# Patient Record
(demographics unavailable — no encounter records)

---

## 2025-01-02 NOTE — REASON FOR VISIT
[Follow-Up Visit] : a follow-up visit for [Ankle Sprain] : ankle sprain [Confirmed Foot Fracture] : confirmed foot fracture [Foot Pain] : foot pain

## 2025-01-06 NOTE — PHYSICAL EXAM
[2+] : left foot dorsalis pedis 2+ [Sensation] : the sensory exam was normal to light touch and pinprick [No Focal Deficits] : no focal deficits [Deep Tendon Reflexes (DTR)] : deep tendon reflexes were 2+ and symmetric [Motor Exam] : the motor exam was normal [Ankle Swelling (On Exam)] : not present [Varicose Veins Of Lower Extremities] : not present [Delayed in the Right Toes] : capillary refills normal in right toes [Delayed in the Left Toes] : capillary refills normal in the left toes [FreeTextEntry3] : Hair growth noted on digits. Proximal to distal cooling is within normal limits.  [de-identified] : Healed cuboid fracture.  [FreeTextEntry1] : Onychomycotic nails 1 and 4 bilateral. They are yellow, thick, brittle with subungual debris, dystrophic, deformed and painful at the tops and tips.  [Diminished Throughout Right Foot] : normal sensation with monofilament testing throughout right foot [Diminished Throughout Left Foot] : normal sensation with monofilament testing throughout left foot

## 2025-01-06 NOTE — PHYSICAL EXAM
[2+] : left foot dorsalis pedis 2+ [Sensation] : the sensory exam was normal to light touch and pinprick [No Focal Deficits] : no focal deficits [Deep Tendon Reflexes (DTR)] : deep tendon reflexes were 2+ and symmetric [Motor Exam] : the motor exam was normal [Ankle Swelling (On Exam)] : not present [Varicose Veins Of Lower Extremities] : not present [Delayed in the Right Toes] : capillary refills normal in right toes [Delayed in the Left Toes] : capillary refills normal in the left toes [FreeTextEntry3] : Hair growth noted on digits. Proximal to distal cooling is within normal limits.  [de-identified] : Healed cuboid fracture.  [FreeTextEntry1] : Onychomycotic nails 1 and 4 bilateral. They are yellow, thick, brittle with subungual debris, dystrophic, deformed and painful at the tops and tips.  [Diminished Throughout Right Foot] : normal sensation with monofilament testing throughout right foot [Diminished Throughout Left Foot] : normal sensation with monofilament testing throughout left foot

## 2025-01-06 NOTE — PHYSICAL EXAM
[2+] : left foot dorsalis pedis 2+ [Sensation] : the sensory exam was normal to light touch and pinprick [No Focal Deficits] : no focal deficits [Deep Tendon Reflexes (DTR)] : deep tendon reflexes were 2+ and symmetric [Motor Exam] : the motor exam was normal [Ankle Swelling (On Exam)] : not present [Varicose Veins Of Lower Extremities] : not present [Delayed in the Right Toes] : capillary refills normal in right toes [Delayed in the Left Toes] : capillary refills normal in the left toes [FreeTextEntry3] : Hair growth noted on digits. Proximal to distal cooling is within normal limits.  [de-identified] : Healed cuboid fracture.  [FreeTextEntry1] : Onychomycotic nails 1 and 4 bilateral. They are yellow, thick, brittle with subungual debris, dystrophic, deformed and painful at the tops and tips.  [Diminished Throughout Right Foot] : normal sensation with monofilament testing throughout right foot [Diminished Throughout Left Foot] : normal sensation with monofilament testing throughout left foot

## 2025-01-06 NOTE — ASSESSMENT
[FreeTextEntry1] : Impression: Onychomycosis. Pain.  Treatment: I manually and mechanically debrided mycotic nails x4 using a small straight nail splitter and rotary faustino. The nails were aggressively debrided and debulked to make them comfortable in shoe gear. I ePrescribed a new prescription for antifungal. Follow-up in 3 months.

## 2025-01-06 NOTE — HISTORY OF PRESENT ILLNESS
[FreeTextEntry1] : Patient presents today because she has old cuboid fracture. It is doing well. That is healed. She has onychomycosis 1 and 4 that are yellow, thick, brittle with subungual debris and mycosis. They are dystrophic, deformed and painful.

## 2025-04-18 NOTE — REASON FOR VISIT
[Initial Evaluation] : an initial evaluation for [Hearing Loss] : hearing loss [FreeTextEntry2] : ear

## 2025-04-18 NOTE — PROCEDURE
[FreeTextEntry3] : Procedure- Removal of cerumen left Diagnosis - Left cerumen impaction verbal consent obtained Left ear found to have some impacted cerumen - it was cleared with suction and curette, canal appeared normal.

## 2025-04-18 NOTE — CONSULT LETTER
[Dear  ___] : Dear  [unfilled], [Consult Letter:] : I had the pleasure of evaluating your patient, [unfilled]. [Please see my note below.] : Please see my note below. [Consult Closing:] : Thank you very much for allowing me to participate in the care of this patient.  If you have any questions, please do not hesitate to contact me. [FreeTextEntry3] : Sincerely,   Trinh Sung MD Otolaryngology- Facial Plastics  [address] [place] [id number] (P) - [phone] (F) - [phone]

## 2025-04-18 NOTE — END OF VISIT
[FreeTextEntry3] : I personally saw and examined the patient in detail. I spoke to BARRY Quezada regarding the assessment and plan of care.  I preformed the procedures and I reviewed the above assessment and plan of care, and agree. I have made changes in changes in the body of the note where appropriate.

## 2025-04-18 NOTE — ASSESSMENT
[FreeTextEntry1] : 75 year old female presents for evaluation of heaviness and buzzing at left ear for last 3 weeks. On exam, excessive cerumen on left, cleared today using suction. possible fluid L TM, but audio shows no effusions - audiogram performed and reviewed 04/18/25 - wnl but type As tymps (possibly with some eustachian tube dysfunction)  - ear hygiene - discussed preventive measures and signs of accumulation - flonase trial for 2-4 weeks for eustachian tube dysfunction  - reassured heaviness feeling is likely to resolve

## 2025-04-18 NOTE — HISTORY OF PRESENT ILLNESS
[de-identified] : 75 year old female presents for evaluation of cloudy hearing of left ear for last few weeks. States noticed it 2 weeks after had RSV shot, both ears felt heavy and cloudy, worse at left ear. States doesnt notice it when shes busy and working. March 31st was woken up by a heavy buzzing sound and lasted 30 minutes, thought it sounded like an explosion. Used drops both ears as prescribed by ENT and allergy that evening and felt some immediate soothing. Left ear hearing was muffled, felt a pop in left ear then hearing improved. no Vertigo, pain, drainage or facial weakness.  Does have runny nose recently but not bothered by it. that is her baseline.